# Patient Record
Sex: FEMALE | NOT HISPANIC OR LATINO | Employment: FULL TIME | URBAN - METROPOLITAN AREA
[De-identification: names, ages, dates, MRNs, and addresses within clinical notes are randomized per-mention and may not be internally consistent; named-entity substitution may affect disease eponyms.]

---

## 2020-04-01 ENCOUNTER — TELEMEDICINE (OUTPATIENT)
Dept: ENDOCRINOLOGY | Facility: HOSPITAL | Age: 30
End: 2020-04-01
Payer: COMMERCIAL

## 2020-04-01 ENCOUNTER — TELEPHONE (OUTPATIENT)
Dept: ENDOCRINOLOGY | Facility: HOSPITAL | Age: 30
End: 2020-04-01

## 2020-04-01 DIAGNOSIS — E28.2 PCOS (POLYCYSTIC OVARIAN SYNDROME): Primary | ICD-10-CM

## 2020-04-01 DIAGNOSIS — D35.2 MICROPROLACTINOMA (HCC): ICD-10-CM

## 2020-04-01 DIAGNOSIS — R63.5 WEIGHT GAIN: ICD-10-CM

## 2020-04-01 PROCEDURE — 99203 OFFICE O/P NEW LOW 30 MIN: CPT | Performed by: INTERNAL MEDICINE

## 2020-04-01 RX ORDER — METFORMIN HYDROCHLORIDE 500 MG/1
500 TABLET, EXTENDED RELEASE ORAL 3 TIMES DAILY
COMMUNITY
Start: 2020-01-07 | End: 2020-04-01 | Stop reason: SDUPTHER

## 2020-04-01 RX ORDER — DEXAMETHASONE 1 MG
TABLET ORAL
Qty: 1 TABLET | Refills: 0 | Status: SHIPPED | OUTPATIENT
Start: 2020-04-01 | End: 2020-06-04

## 2020-04-01 RX ORDER — METFORMIN HYDROCHLORIDE 500 MG/1
TABLET, EXTENDED RELEASE ORAL
Qty: 360 TABLET | Refills: 3 | Status: SHIPPED | OUTPATIENT
Start: 2020-04-01 | End: 2021-02-16

## 2020-04-24 LAB
17OHP SERPL-MCNC: 287 NG/DL
ALBUMIN SERPL-MCNC: 4.8 G/DL (ref 3.8–4.8)
ALBUMIN/GLOB SERPL: 2.5 {RATIO} (ref 1.2–2.2)
ALP SERPL-CCNC: 83 IU/L (ref 39–117)
ALT SERPL-CCNC: 11 IU/L (ref 0–32)
AST SERPL-CCNC: 14 IU/L (ref 0–40)
BASOPHILS # BLD AUTO: 0 X10E3/UL (ref 0–0.2)
BASOPHILS NFR BLD AUTO: 1 %
BILIRUB SERPL-MCNC: 0.3 MG/DL (ref 0–1.2)
BUN SERPL-MCNC: 10 MG/DL (ref 6–20)
BUN/CREAT SERPL: 14 (ref 9–23)
CALCIUM SERPL-MCNC: 9.5 MG/DL (ref 8.7–10.2)
CHLORIDE SERPL-SCNC: 102 MMOL/L (ref 96–106)
CO2 SERPL-SCNC: 21 MMOL/L (ref 20–29)
CORTIS AM PEAK SERPL-MCNC: 0.8 UG/DL (ref 6.2–19.4)
CREAT SERPL-MCNC: 0.73 MG/DL (ref 0.57–1)
DEXAMETHASONE SERPL-MCNC: 283 NG/DL
DHEA-S SERPL-MCNC: 232 UG/DL (ref 84.8–378)
EOSINOPHIL # BLD AUTO: 0 X10E3/UL (ref 0–0.4)
EOSINOPHIL NFR BLD AUTO: 0 %
ERYTHROCYTE [DISTWIDTH] IN BLOOD BY AUTOMATED COUNT: 11.8 % (ref 11.7–15.4)
EST. AVERAGE GLUCOSE BLD GHB EST-MCNC: 85 MG/DL
FERRITIN SERPL-MCNC: 29 NG/ML (ref 15–150)
FSH SERPL-ACNC: 5.3 MIU/ML
GLOBULIN SER-MCNC: 1.9 G/DL (ref 1.5–4.5)
GLUCOSE SERPL-MCNC: 103 MG/DL (ref 65–99)
HBA1C MFR BLD: 4.6 % (ref 4.8–5.6)
HCT VFR BLD AUTO: 44.6 % (ref 34–46.6)
HGB BLD-MCNC: 15.1 G/DL (ref 11.1–15.9)
IGF-I SERPL-MCNC: 192 NG/ML (ref 73–243)
IMM GRANULOCYTES # BLD: 0 X10E3/UL (ref 0–0.1)
IMM GRANULOCYTES NFR BLD: 0 %
IRON SATN MFR SERPL: 23 % (ref 15–55)
IRON SERPL-MCNC: 101 UG/DL (ref 27–159)
LH SERPL-ACNC: 15.9 MIU/ML
LYMPHOCYTES # BLD AUTO: 0.8 X10E3/UL (ref 0.7–3.1)
LYMPHOCYTES NFR BLD AUTO: 18 %
MCH RBC QN AUTO: 31.5 PG (ref 26.6–33)
MCHC RBC AUTO-ENTMCNC: 33.9 G/DL (ref 31.5–35.7)
MCV RBC AUTO: 93 FL (ref 79–97)
MONOCYTES # BLD AUTO: 0.3 X10E3/UL (ref 0.1–0.9)
MONOCYTES NFR BLD AUTO: 7 %
NEUTROPHILS # BLD AUTO: 3.2 X10E3/UL (ref 1.4–7)
NEUTROPHILS NFR BLD AUTO: 74 %
PLATELET # BLD AUTO: 198 X10E3/UL (ref 150–450)
POTASSIUM SERPL-SCNC: 4.8 MMOL/L (ref 3.5–5.2)
PROLACTIN SERPL-MCNC: 14.3 NG/ML (ref 4.8–23.3)
PROT SERPL-MCNC: 6.7 G/DL (ref 6–8.5)
RBC # BLD AUTO: 4.79 X10E6/UL (ref 3.77–5.28)
SL AMB EGFR AFRICAN AMERICAN: 127 ML/MIN/1.73
SL AMB EGFR NON AFRICAN AMERICAN: 110 ML/MIN/1.73
SODIUM SERPL-SCNC: 139 MMOL/L (ref 134–144)
T4 FREE SERPL-MCNC: 1 NG/DL (ref 0.82–1.77)
TESTOST FREE SERPL-MCNC: 0.7 PG/ML (ref 0–4.2)
TESTOST SERPL-MCNC: 39 NG/DL (ref 8–48)
TIBC SERPL-MCNC: 437 UG/DL (ref 250–450)
TSH SERPL DL<=0.005 MIU/L-ACNC: 1.89 UIU/ML (ref 0.45–4.5)
UIBC SERPL-MCNC: 336 UG/DL (ref 131–425)
WBC # BLD AUTO: 4.3 X10E3/UL (ref 3.4–10.8)

## 2020-04-27 ENCOUNTER — TELEPHONE (OUTPATIENT)
Dept: ENDOCRINOLOGY | Facility: HOSPITAL | Age: 30
End: 2020-04-27

## 2020-04-27 DIAGNOSIS — E28.2 PCOS (POLYCYSTIC OVARIAN SYNDROME): Primary | ICD-10-CM

## 2020-05-25 LAB
17OH-PREG SERPL-MCNC: 87 NG/DL
DHEA-S SERPL-MCNC: 330 UG/DL (ref 84.8–378)
TESTOST FREE SERPL-MCNC: 2.5 PG/ML (ref 0–4.2)

## 2020-06-02 LAB — 17OHP SERPL-MCNC: 207 NG/DL

## 2020-06-04 ENCOUNTER — TREATMENT (OUTPATIENT)
Dept: ENDOCRINOLOGY | Facility: HOSPITAL | Age: 30
End: 2020-06-04

## 2020-06-04 PROBLEM — R79.89 HIGH SERUM 17-HYDROXYPROGESTERONE: Status: ACTIVE | Noted: 2020-06-04

## 2020-06-04 PROBLEM — E25.0 CONGENITAL ADRENAL HYPERPLASIA (HCC): Status: ACTIVE | Noted: 2020-06-04

## 2020-06-04 RX ORDER — COSYNTROPIN 0.25 MG/ML
250 INJECTION, POWDER, FOR SOLUTION INTRAMUSCULAR; INTRAVENOUS ONCE
Status: CANCELLED | OUTPATIENT
Start: 2020-06-16

## 2020-06-08 ENCOUNTER — TELEPHONE (OUTPATIENT)
Dept: ENDOCRINOLOGY | Facility: HOSPITAL | Age: 30
End: 2020-06-08

## 2020-06-10 ENCOUNTER — HOSPITAL ENCOUNTER (OUTPATIENT)
Dept: INFUSION CENTER | Facility: HOSPITAL | Age: 30
Discharge: HOME | End: 2020-06-10
Attending: INTERNAL MEDICINE

## 2020-06-12 LAB
17OHP SERPL-MCNC: 50 NG/DL
TESTOST FREE SERPL-MCNC: 2.6 PG/ML (ref 0–4.2)
TESTOST SERPL-MCNC: 49 NG/DL (ref 8–48)

## 2020-06-16 ENCOUNTER — TELEPHONE (OUTPATIENT)
Dept: ENDOCRINOLOGY | Facility: HOSPITAL | Age: 30
End: 2020-06-16

## 2020-06-16 ENCOUNTER — HOSPITAL ENCOUNTER (OUTPATIENT)
Dept: INFUSION CENTER | Facility: HOSPITAL | Age: 30
Discharge: HOME | End: 2020-06-16
Attending: INTERNAL MEDICINE
Payer: COMMERCIAL

## 2020-06-16 VITALS
HEART RATE: 89 BPM | TEMPERATURE: 98.5 F | RESPIRATION RATE: 16 BRPM | SYSTOLIC BLOOD PRESSURE: 132 MMHG | OXYGEN SATURATION: 100 % | DIASTOLIC BLOOD PRESSURE: 71 MMHG

## 2020-06-16 DIAGNOSIS — E25.0 CONGENITAL ADRENAL HYPERPLASIA (HCC): Primary | ICD-10-CM

## 2020-06-16 DIAGNOSIS — R79.89 HIGH SERUM 17-HYDROXYPROGESTERONE: ICD-10-CM

## 2020-06-16 LAB
CORTIS SERPL-MCNC: 26.9 UG/DL
CORTIS SERPL-MCNC: 32.5 UG/DL
CORTIS SERPL-MCNC: 7.2 UG/DL

## 2020-06-16 PROCEDURE — 82634 DEOXYCORTISOL: CPT | Performed by: INTERNAL MEDICINE

## 2020-06-16 PROCEDURE — 82157 ASSAY OF ANDROSTENEDIONE: CPT | Performed by: INTERNAL MEDICINE

## 2020-06-16 PROCEDURE — 82627 DEHYDROEPIANDROSTERONE: CPT | Performed by: INTERNAL MEDICINE

## 2020-06-16 PROCEDURE — 82533 TOTAL CORTISOL: CPT | Performed by: INTERNAL MEDICINE

## 2020-06-16 PROCEDURE — 83498 ASY HYDROXYPROGESTERONE 17-D: CPT | Performed by: INTERNAL MEDICINE

## 2020-06-16 PROCEDURE — 84143 ASSAY OF 17-HYDROXYPREGNENO: CPT | Performed by: INTERNAL MEDICINE

## 2020-06-16 PROCEDURE — 82626 DEHYDROEPIANDROSTERONE: CPT | Performed by: INTERNAL MEDICINE

## 2020-06-16 RX ORDER — COSYNTROPIN 0.25 MG/ML
250 INJECTION, POWDER, FOR SOLUTION INTRAMUSCULAR; INTRAVENOUS ONCE
Status: COMPLETED | OUTPATIENT
Start: 2020-06-16 | End: 2020-06-16

## 2020-06-16 RX ORDER — COSYNTROPIN 0.25 MG/ML
250 INJECTION, POWDER, FOR SOLUTION INTRAMUSCULAR; INTRAVENOUS ONCE
Status: CANCELLED | OUTPATIENT
Start: 2020-06-16

## 2020-06-16 RX ORDER — COSYNTROPIN 0.25 MG/ML
0.25 INJECTION, POWDER, FOR SOLUTION INTRAMUSCULAR; INTRAVENOUS ONCE
Status: CANCELLED | OUTPATIENT
Start: 2020-06-16

## 2020-06-16 RX ADMIN — COSYNTROPIN 250 MCG: 0.25 INJECTION, POWDER, LYOPHILIZED, FOR SOLUTION INTRAMUSCULAR; INTRAVENOUS at 10:53

## 2020-06-17 LAB
DHEA-S SERPL-MCNC: 357 UG/DL (ref 84.8–378)
DHEA-S SERPL-MCNC: 364 UG/DL (ref 84.8–378)
DHEA-S SERPL-MCNC: 398 UG/DL (ref 84.8–378)

## 2020-06-19 LAB — 17OHP SERPL-MCNC: 212 NG/DL

## 2020-06-21 LAB
17OHP SERPL-MCNC: 224 NG/DL
17OHP SERPL-MCNC: 237 NG/DL
ANDROST SERPL-MCNC: 105 NG/DL (ref 41–262)
ANDROST SERPL-MCNC: 135 NG/DL (ref 41–262)
ANDROST SERPL-MCNC: 174 NG/DL (ref 41–262)

## 2020-06-22 LAB
17OH-PREG SERPL-MCNC: 1010 NG/DL
17OH-PREG SERPL-MCNC: 45 NG/DL
17OH-PREG SERPL-MCNC: 944 NG/DL
DHEA SERPL-MCNC: 220 NG/DL (ref 31–701)
DHEA SERPL-MCNC: 714 NG/DL (ref 31–701)
DHEA SERPL-MCNC: 843 NG/DL (ref 31–701)

## 2020-06-23 LAB
11DC SERPL-MCNC: 0.08 UG/DL
11DC SERPL-MCNC: 0.08 UG/DL
11DC SERPL-MCNC: <0.01 UG/DL

## 2020-07-01 DIAGNOSIS — E66.3 OVERWEIGHT: ICD-10-CM

## 2020-07-01 DIAGNOSIS — R73.9 HYPERGLYCEMIA: ICD-10-CM

## 2020-07-01 DIAGNOSIS — E28.2 PCOS (POLYCYSTIC OVARIAN SYNDROME): Primary | ICD-10-CM

## 2020-07-09 ENCOUNTER — TELEPHONE (OUTPATIENT)
Dept: ENDOCRINOLOGY | Facility: HOSPITAL | Age: 30
End: 2020-07-09

## 2020-07-13 DIAGNOSIS — E28.2 PCOS (POLYCYSTIC OVARIAN SYNDROME): ICD-10-CM

## 2020-08-17 DIAGNOSIS — E66.3 OVERWEIGHT: ICD-10-CM

## 2020-08-17 DIAGNOSIS — E28.2 PCOS (POLYCYSTIC OVARIAN SYNDROME): ICD-10-CM

## 2020-08-17 DIAGNOSIS — R73.9 HYPERGLYCEMIA: ICD-10-CM

## 2020-08-18 RX ORDER — PEN NEEDLE, DIABETIC 32GX 5/32"
NEEDLE, DISPOSABLE MISCELLANEOUS
Qty: 100 EACH | Refills: 0 | Status: SHIPPED | OUTPATIENT
Start: 2020-08-18 | End: 2021-04-16

## 2020-09-22 ENCOUNTER — DOCUMENTATION (OUTPATIENT)
Dept: ENDOCRINOLOGY | Facility: HOSPITAL | Age: 30
End: 2020-09-22

## 2020-10-22 DIAGNOSIS — E66.3 OVERWEIGHT: ICD-10-CM

## 2020-10-22 DIAGNOSIS — R73.9 HYPERGLYCEMIA: ICD-10-CM

## 2020-10-22 DIAGNOSIS — E28.2 PCOS (POLYCYSTIC OVARIAN SYNDROME): ICD-10-CM

## 2020-10-22 RX ORDER — LIRAGLUTIDE 6 MG/ML
INJECTION, SOLUTION SUBCUTANEOUS
Qty: 15 ML | Refills: 0 | Status: SHIPPED | OUTPATIENT
Start: 2020-10-22 | End: 2020-11-09

## 2020-11-09 DIAGNOSIS — E66.3 OVERWEIGHT: ICD-10-CM

## 2020-11-09 DIAGNOSIS — E28.2 PCOS (POLYCYSTIC OVARIAN SYNDROME): ICD-10-CM

## 2020-11-09 DIAGNOSIS — R73.9 HYPERGLYCEMIA: ICD-10-CM

## 2020-11-09 RX ORDER — LIRAGLUTIDE 6 MG/ML
INJECTION, SOLUTION SUBCUTANEOUS
Qty: 15 ML | Refills: 0 | Status: SHIPPED | OUTPATIENT
Start: 2020-11-09 | End: 2020-11-11 | Stop reason: SDUPTHER

## 2020-11-11 ENCOUNTER — OFFICE VISIT (OUTPATIENT)
Dept: ENDOCRINOLOGY | Facility: HOSPITAL | Age: 30
End: 2020-11-11
Payer: COMMERCIAL

## 2020-11-11 VITALS
DIASTOLIC BLOOD PRESSURE: 80 MMHG | HEIGHT: 65 IN | TEMPERATURE: 97.8 F | HEART RATE: 91 BPM | BODY MASS INDEX: 26.36 KG/M2 | SYSTOLIC BLOOD PRESSURE: 122 MMHG | WEIGHT: 158.2 LBS

## 2020-11-11 DIAGNOSIS — D35.2 MICROPROLACTINOMA (HCC): ICD-10-CM

## 2020-11-11 DIAGNOSIS — E28.2 PCOS (POLYCYSTIC OVARIAN SYNDROME): Primary | ICD-10-CM

## 2020-11-11 DIAGNOSIS — R73.9 HYPERGLYCEMIA: ICD-10-CM

## 2020-11-11 DIAGNOSIS — E66.3 OVERWEIGHT: ICD-10-CM

## 2020-11-11 PROCEDURE — 99214 OFFICE O/P EST MOD 30 MIN: CPT | Performed by: INTERNAL MEDICINE

## 2020-11-11 RX ORDER — LIRAGLUTIDE 6 MG/ML
INJECTION, SOLUTION SUBCUTANEOUS
Qty: 15 ML | Refills: 11 | Status: SHIPPED | OUTPATIENT
Start: 2020-11-11 | End: 2021-04-16

## 2020-12-22 ENCOUNTER — DOCUMENTATION (OUTPATIENT)
Dept: ENDOCRINOLOGY | Facility: HOSPITAL | Age: 30
End: 2020-12-22

## 2020-12-29 DIAGNOSIS — D35.2 MICROPROLACTINOMA (HCC): ICD-10-CM

## 2020-12-29 DIAGNOSIS — R73.9 HYPERGLYCEMIA: ICD-10-CM

## 2020-12-29 DIAGNOSIS — E28.2 PCOS (POLYCYSTIC OVARIAN SYNDROME): Primary | ICD-10-CM

## 2020-12-29 DIAGNOSIS — E66.3 OVERWEIGHT: ICD-10-CM

## 2021-01-05 LAB
17OHP SERPL-MCNC: 48 NG/DL
ALBUMIN SERPL-MCNC: 4.5 G/DL (ref 3.8–4.8)
ALBUMIN/GLOB SERPL: 2 {RATIO} (ref 1.2–2.2)
ALP SERPL-CCNC: 90 IU/L (ref 39–117)
ALT SERPL-CCNC: 15 IU/L (ref 0–32)
AST SERPL-CCNC: 20 IU/L (ref 0–40)
BASOPHILS # BLD AUTO: 0 X10E3/UL (ref 0–0.2)
BASOPHILS NFR BLD AUTO: 1 %
BILIRUB SERPL-MCNC: 0.4 MG/DL (ref 0–1.2)
BUN SERPL-MCNC: 10 MG/DL (ref 6–20)
BUN/CREAT SERPL: 15 (ref 9–23)
CALCIUM SERPL-MCNC: 9.5 MG/DL (ref 8.7–10.2)
CHLORIDE SERPL-SCNC: 102 MMOL/L (ref 96–106)
CO2 SERPL-SCNC: 22 MMOL/L (ref 20–29)
CREAT SERPL-MCNC: 0.66 MG/DL (ref 0.57–1)
DHEA-S SERPL-MCNC: 194 UG/DL
EOSINOPHIL # BLD AUTO: 0.1 X10E3/UL (ref 0–0.4)
EOSINOPHIL NFR BLD AUTO: 3 %
ERYTHROCYTE [DISTWIDTH] IN BLOOD BY AUTOMATED COUNT: 11.5 % (ref 11.7–15.4)
GLOBULIN SER-MCNC: 2.3 G/DL (ref 1.5–4.5)
GLUCOSE SERPL-MCNC: 87 MG/DL (ref 65–99)
HCT VFR BLD AUTO: 42 % (ref 34–46.6)
HGB BLD-MCNC: 14.8 G/DL (ref 11.1–15.9)
IMM GRANULOCYTES # BLD: 0 X10E3/UL (ref 0–0.1)
IMM GRANULOCYTES NFR BLD: 0 %
LYMPHOCYTES # BLD AUTO: 1 X10E3/UL (ref 0.7–3.1)
LYMPHOCYTES NFR BLD AUTO: 24 %
MCH RBC QN AUTO: 33 PG (ref 26.6–33)
MCHC RBC AUTO-ENTMCNC: 35.2 G/DL (ref 31.5–35.7)
MCV RBC AUTO: 94 FL (ref 79–97)
MONOCYTES # BLD AUTO: 0.5 X10E3/UL (ref 0.1–0.9)
MONOCYTES NFR BLD AUTO: 12 %
NEUTROPHILS # BLD AUTO: 2.3 X10E3/UL (ref 1.4–7)
NEUTROPHILS NFR BLD AUTO: 60 %
PLATELET # BLD AUTO: 195 X10E3/UL (ref 150–450)
POTASSIUM SERPL-SCNC: 4.3 MMOL/L (ref 3.5–5.2)
PROLACTIN SERPL-MCNC: 10.7 NG/ML (ref 4.8–23.3)
PROT SERPL-MCNC: 6.8 G/DL (ref 6–8.5)
RBC # BLD AUTO: 4.48 X10E6/UL (ref 3.77–5.28)
SL AMB EGFR AFRICAN AMERICAN: 135 ML/MIN/1.73
SL AMB EGFR NON AFRICAN AMERICAN: 117 ML/MIN/1.73
SODIUM SERPL-SCNC: 139 MMOL/L (ref 134–144)
T4 FREE SERPL-MCNC: 1.17 NG/DL (ref 0.82–1.77)
TESTOST FREE SERPL-MCNC: 2.6 PG/ML (ref 0–4.2)
TESTOST SERPL-MCNC: 47.4 NG/DL (ref 10–55)
TSH SERPL DL<=0.005 MIU/L-ACNC: 1.91 UIU/ML (ref 0.45–4.5)
WBC # BLD AUTO: 3.9 X10E3/UL (ref 3.4–10.8)

## 2021-02-16 DIAGNOSIS — E28.2 PCOS (POLYCYSTIC OVARIAN SYNDROME): ICD-10-CM

## 2021-02-16 RX ORDER — METFORMIN HYDROCHLORIDE 500 MG/1
TABLET, EXTENDED RELEASE ORAL
Qty: 360 TABLET | Refills: 3 | Status: SHIPPED | OUTPATIENT
Start: 2021-02-16 | End: 2021-04-16

## 2021-08-27 ENCOUNTER — TELEPHONE (OUTPATIENT)
Dept: ENDOCRINOLOGY | Facility: HOSPITAL | Age: 31
End: 2021-08-27

## 2021-10-15 DIAGNOSIS — E66.3 OVERWEIGHT: ICD-10-CM

## 2021-10-15 DIAGNOSIS — E28.2 PCOS (POLYCYSTIC OVARIAN SYNDROME): ICD-10-CM

## 2021-10-15 DIAGNOSIS — R73.9 HYPERGLYCEMIA: ICD-10-CM

## 2021-10-15 RX ORDER — LIRAGLUTIDE 6 MG/ML
INJECTION, SOLUTION SUBCUTANEOUS
Qty: 15 ML | Refills: 0 | Status: SHIPPED | OUTPATIENT
Start: 2021-10-15 | End: 2021-11-15

## 2021-10-19 LAB — SARS-COV-2 ANTIBODIES: NEGATIVE

## 2021-10-20 ENCOUNTER — OFFICE VISIT (OUTPATIENT)
Dept: ENDOCRINOLOGY | Facility: HOSPITAL | Age: 31
End: 2021-10-20
Payer: COMMERCIAL

## 2021-10-20 VITALS
BODY MASS INDEX: 25.59 KG/M2 | WEIGHT: 153.6 LBS | HEART RATE: 84 BPM | SYSTOLIC BLOOD PRESSURE: 124 MMHG | HEIGHT: 65 IN | DIASTOLIC BLOOD PRESSURE: 80 MMHG

## 2021-10-20 DIAGNOSIS — D35.2 MICROPROLACTINOMA (HCC): Primary | ICD-10-CM

## 2021-10-20 DIAGNOSIS — E28.2 PCOS (POLYCYSTIC OVARIAN SYNDROME): ICD-10-CM

## 2021-10-20 PROCEDURE — 99214 OFFICE O/P EST MOD 30 MIN: CPT | Performed by: INTERNAL MEDICINE

## 2021-10-23 LAB
17OHP SERPL-MCNC: 65 NG/DL
ALBUMIN SERPL-MCNC: 4.8 G/DL (ref 3.8–4.8)
ALBUMIN/GLOB SERPL: 2.3 {RATIO} (ref 1.2–2.2)
ALP SERPL-CCNC: 77 IU/L (ref 44–121)
ALT SERPL-CCNC: 11 IU/L (ref 0–32)
AST SERPL-CCNC: 13 IU/L (ref 0–40)
BILIRUB SERPL-MCNC: 0.7 MG/DL (ref 0–1.2)
BUN SERPL-MCNC: 7 MG/DL (ref 6–20)
BUN/CREAT SERPL: 11 (ref 9–23)
CALCIUM SERPL-MCNC: 9.2 MG/DL (ref 8.7–10.2)
CHLORIDE SERPL-SCNC: 103 MMOL/L (ref 96–106)
CO2 SERPL-SCNC: 23 MMOL/L (ref 20–29)
CREAT SERPL-MCNC: 0.64 MG/DL (ref 0.57–1)
DHEA-S SERPL-MCNC: 324 UG/DL (ref 84.8–378)
GLOBULIN SER-MCNC: 2.1 G/DL (ref 1.5–4.5)
GLUCOSE SERPL-MCNC: 89 MG/DL (ref 65–99)
POTASSIUM SERPL-SCNC: 4.1 MMOL/L (ref 3.5–5.2)
PROLACTIN SERPL-MCNC: 15.5 NG/ML (ref 4.8–23.3)
PROT SERPL-MCNC: 6.9 G/DL (ref 6–8.5)
SL AMB EGFR AFRICAN AMERICAN: 136 ML/MIN/1.73
SL AMB EGFR NON AFRICAN AMERICAN: 118 ML/MIN/1.73
SODIUM SERPL-SCNC: 140 MMOL/L (ref 134–144)
T4 FREE SERPL-MCNC: 1.29 NG/DL (ref 0.82–1.77)
TESTOST FREE SERPL-MCNC: 3.6 PG/ML (ref 0–4.2)
TESTOST SERPL-MCNC: 68 NG/DL (ref 8–60)
TSH SERPL DL<=0.005 MIU/L-ACNC: 1.86 UIU/ML (ref 0.45–4.5)

## 2021-11-15 DIAGNOSIS — E66.3 OVERWEIGHT: ICD-10-CM

## 2021-11-15 DIAGNOSIS — R73.9 HYPERGLYCEMIA: ICD-10-CM

## 2021-11-15 DIAGNOSIS — E28.2 PCOS (POLYCYSTIC OVARIAN SYNDROME): ICD-10-CM

## 2021-11-15 RX ORDER — LIRAGLUTIDE 6 MG/ML
INJECTION, SOLUTION SUBCUTANEOUS
Qty: 15 ML | Refills: 0 | Status: SHIPPED | OUTPATIENT
Start: 2021-11-15 | End: 2021-12-13 | Stop reason: SDUPTHER

## 2021-11-24 ENCOUNTER — DOCUMENTATION (OUTPATIENT)
Dept: ENDOCRINOLOGY | Facility: HOSPITAL | Age: 31
End: 2021-11-24

## 2021-12-13 DIAGNOSIS — E28.2 PCOS (POLYCYSTIC OVARIAN SYNDROME): ICD-10-CM

## 2021-12-13 DIAGNOSIS — E66.3 OVERWEIGHT: ICD-10-CM

## 2021-12-13 DIAGNOSIS — R73.9 HYPERGLYCEMIA: ICD-10-CM

## 2021-12-13 RX ORDER — LIRAGLUTIDE 6 MG/ML
3 INJECTION, SOLUTION SUBCUTANEOUS DAILY
Qty: 15 ML | Refills: 11 | Status: SHIPPED | OUTPATIENT
Start: 2021-12-13

## 2021-12-13 RX ORDER — PEN NEEDLE, DIABETIC 32GX 5/32"
NEEDLE, DISPOSABLE MISCELLANEOUS DAILY
Qty: 100 EACH | Refills: 3 | Status: SHIPPED | OUTPATIENT
Start: 2021-12-13

## 2021-12-27 DIAGNOSIS — E28.2 PCOS (POLYCYSTIC OVARIAN SYNDROME): Primary | ICD-10-CM

## 2022-03-14 DIAGNOSIS — E28.2 PCOS (POLYCYSTIC OVARIAN SYNDROME): Primary | ICD-10-CM

## 2022-05-26 DIAGNOSIS — E28.2 PCOS (POLYCYSTIC OVARIAN SYNDROME): ICD-10-CM

## 2022-06-17 DIAGNOSIS — E28.2 PCOS (POLYCYSTIC OVARIAN SYNDROME): ICD-10-CM

## 2022-07-11 DIAGNOSIS — E28.2 PCOS (POLYCYSTIC OVARIAN SYNDROME): ICD-10-CM

## 2022-07-28 DIAGNOSIS — E28.2 PCOS (POLYCYSTIC OVARIAN SYNDROME): ICD-10-CM

## 2022-08-29 DIAGNOSIS — E28.2 PCOS (POLYCYSTIC OVARIAN SYNDROME): ICD-10-CM

## 2022-09-21 DIAGNOSIS — E28.2 PCOS (POLYCYSTIC OVARIAN SYNDROME): ICD-10-CM

## 2022-11-01 ENCOUNTER — TELEPHONE (OUTPATIENT)
Dept: ENDOCRINOLOGY | Facility: HOSPITAL | Age: 32
End: 2022-11-01

## 2022-11-01 NOTE — TELEPHONE ENCOUNTER
Received fax to update prior auth for Saxenda 18mg/3mL  Received notice medication is not covered by the patient's plan  Please advise

## 2022-11-07 DIAGNOSIS — E28.2 PCOS (POLYCYSTIC OVARIAN SYNDROME): ICD-10-CM

## 2022-11-07 DIAGNOSIS — E66.3 OVERWEIGHT: ICD-10-CM

## 2022-11-07 DIAGNOSIS — R73.9 HYPERGLYCEMIA: ICD-10-CM

## 2022-11-07 RX ORDER — LIRAGLUTIDE 6 MG/ML
3 INJECTION, SOLUTION SUBCUTANEOUS DAILY
Qty: 15 ML | Refills: 0 | Status: SHIPPED | OUTPATIENT
Start: 2022-11-07

## 2022-11-08 ENCOUNTER — TELEPHONE (OUTPATIENT)
Dept: ENDOCRINOLOGY | Facility: CLINIC | Age: 32
End: 2022-11-08

## 2022-11-08 NOTE — TELEPHONE ENCOUNTER
David Darling [facility])  Rx #: [id number]  Saxenda 18MG/3ML pen-injectors       Form  [employer] 4-Part Electronic PA Form (5840 NCPDP)

## 2022-11-29 DIAGNOSIS — E28.2 PCOS (POLYCYSTIC OVARIAN SYNDROME): ICD-10-CM

## 2022-11-30 DIAGNOSIS — E28.2 PCOS (POLYCYSTIC OVARIAN SYNDROME): ICD-10-CM

## 2022-12-06 DIAGNOSIS — E28.2 PCOS (POLYCYSTIC OVARIAN SYNDROME): Primary | ICD-10-CM

## 2022-12-06 DIAGNOSIS — E66.3 OVERWEIGHT: ICD-10-CM

## 2022-12-06 DIAGNOSIS — D35.2 MICROPROLACTINOMA (HCC): ICD-10-CM

## 2022-12-06 DIAGNOSIS — R73.9 HYPERGLYCEMIA: ICD-10-CM

## 2022-12-14 DIAGNOSIS — E28.2 PCOS (POLYCYSTIC OVARIAN SYNDROME): ICD-10-CM

## 2022-12-14 DIAGNOSIS — R73.9 HYPERGLYCEMIA: ICD-10-CM

## 2022-12-14 DIAGNOSIS — E66.3 OVERWEIGHT: ICD-10-CM

## 2022-12-14 RX ORDER — LIRAGLUTIDE 6 MG/ML
3 INJECTION, SOLUTION SUBCUTANEOUS DAILY
Qty: 15 ML | Refills: 0 | Status: SHIPPED | OUTPATIENT
Start: 2022-12-14

## 2023-01-09 DIAGNOSIS — R73.9 HYPERGLYCEMIA: ICD-10-CM

## 2023-01-09 DIAGNOSIS — E66.3 OVERWEIGHT: ICD-10-CM

## 2023-01-09 DIAGNOSIS — E28.2 PCOS (POLYCYSTIC OVARIAN SYNDROME): ICD-10-CM

## 2023-01-09 RX ORDER — LIRAGLUTIDE 6 MG/ML
INJECTION, SOLUTION SUBCUTANEOUS
Qty: 15 ML | Refills: 0 | Status: SHIPPED | OUTPATIENT
Start: 2023-01-09

## 2023-01-09 RX ORDER — LIRAGLUTIDE 6 MG/ML
3 INJECTION, SOLUTION SUBCUTANEOUS DAILY
Qty: 15 ML | Refills: 0 | Status: SHIPPED | OUTPATIENT
Start: 2023-01-09

## 2023-01-09 RX ORDER — PEN NEEDLE, DIABETIC 32GX 5/32"
NEEDLE, DISPOSABLE MISCELLANEOUS DAILY
Qty: 100 EACH | Refills: 0 | Status: SHIPPED | OUTPATIENT
Start: 2023-01-09

## 2023-01-14 LAB
17OHP SERPL-MCNC: 53 NG/DL
ALBUMIN SERPL-MCNC: 4.5 G/DL (ref 3.8–4.8)
ALBUMIN/GLOB SERPL: 2 {RATIO} (ref 1.2–2.2)
ALP SERPL-CCNC: 86 IU/L (ref 44–121)
ALT SERPL-CCNC: 17 IU/L (ref 0–32)
AST SERPL-CCNC: 21 IU/L (ref 0–40)
BILIRUB SERPL-MCNC: 0.6 MG/DL (ref 0–1.2)
BUN SERPL-MCNC: 6 MG/DL (ref 6–20)
BUN/CREAT SERPL: 10 (ref 9–23)
CALCIUM SERPL-MCNC: 9.2 MG/DL (ref 8.7–10.2)
CHLORIDE SERPL-SCNC: 100 MMOL/L (ref 96–106)
CO2 SERPL-SCNC: 24 MMOL/L (ref 20–29)
CREAT SERPL-MCNC: 0.6 MG/DL (ref 0.57–1)
DHEA-S SERPL-MCNC: 272 UG/DL (ref 84.8–378)
EGFR: 121 ML/MIN/1.73
GLOBULIN SER-MCNC: 2.3 G/DL (ref 1.5–4.5)
GLUCOSE SERPL-MCNC: 83 MG/DL (ref 70–99)
POTASSIUM SERPL-SCNC: 4 MMOL/L (ref 3.5–5.2)
PROLACTIN SERPL-MCNC: 12 NG/ML (ref 4.8–23.3)
PROT SERPL-MCNC: 6.8 G/DL (ref 6–8.5)
SODIUM SERPL-SCNC: 140 MMOL/L (ref 134–144)
TESTOST FREE SERPL-MCNC: 3.3 PG/ML (ref 0–4.2)
TESTOST SERPL-MCNC: 64 NG/DL (ref 8–60)
TSH SERPL DL<=0.005 MIU/L-ACNC: 2.13 UIU/ML (ref 0.45–4.5)

## 2023-01-19 DIAGNOSIS — E28.2 PCOS (POLYCYSTIC OVARIAN SYNDROME): ICD-10-CM

## 2023-01-20 NOTE — TELEPHONE ENCOUNTER
Requested medication(s) are due for refill today: Yes  Patient has already received a courtesy refill: Yes  Other reason request has been forwarded to provider: Duplicated

## 2023-01-30 ENCOUNTER — OFFICE VISIT (OUTPATIENT)
Dept: ENDOCRINOLOGY | Facility: CLINIC | Age: 33
End: 2023-01-30

## 2023-01-30 VITALS
HEART RATE: 80 BPM | WEIGHT: 160.6 LBS | OXYGEN SATURATION: 99 % | DIASTOLIC BLOOD PRESSURE: 76 MMHG | HEIGHT: 66 IN | BODY MASS INDEX: 25.81 KG/M2 | SYSTOLIC BLOOD PRESSURE: 132 MMHG

## 2023-01-30 DIAGNOSIS — E28.2 PCOS (POLYCYSTIC OVARIAN SYNDROME): Primary | ICD-10-CM

## 2023-01-30 DIAGNOSIS — R73.9 HYPERGLYCEMIA: ICD-10-CM

## 2023-01-30 NOTE — PROGRESS NOTES
1/30/2023    Assessment/Plan      Diagnoses and all orders for this visit:    PCOS (polycystic ovarian syndrome)  -     Hemoglobin A1C; Future  -     Comprehensive metabolic panel; Future  -     TSH, 3rd generation; Future  -     Testosterone, free, total Lab Collect; Future  -     DHEA-sulfate- Lab Collect; Future  -     17-Hydroxyprogesterone- Lab Collect; Future  -     Prolactin Lab Collect; Future  -     Hemoglobin A1C  -     Comprehensive metabolic panel  -     TSH, 3rd generation  -     Testosterone, free, total Lab Collect  -     DHEA-sulfate- Lab Collect  -     17-Hydroxyprogesterone- Lab Collect  -     Prolactin Lab Collect    Hyperglycemia  -     Hemoglobin A1C; Future  -     Comprehensive metabolic panel; Future  -     TSH, 3rd generation; Future  -     Testosterone, free, total Lab Collect; Future  -     DHEA-sulfate- Lab Collect; Future  -     17-Hydroxyprogesterone- Lab Collect; Future  -     Prolactin Lab Collect; Future  -     Hemoglobin A1C  -     Comprehensive metabolic panel  -     TSH, 3rd generation  -     Testosterone, free, total Lab Collect  -     DHEA-sulfate- Lab Collect  -     17-Hydroxyprogesterone- Lab Collect  -     Prolactin Lab Collect        Assessment/Plan:  78-year-old female presents for a follow-up appointment overall feeling well  She continues to tolerate and doing well on maintenance metformin and Saxenda in the setting of PCOS and also for weight loss  Denies plans for pregnancy at this time  Follow-up in the office in 1 year with labs just prior  CC: Follow-up    History of Present Illness     HPI: Kimmie Rendon is a 27y o  year old female with history of PCOS, micro prolactinoma, reported history of hypothyroidism who presents for a follow-up appointment  Initial consultation was done via telemedicine on 4/1/2020  She presents today for a follow-up overall feeling well  She continues on metformin XR 5 to milligrams twice daily and Saxenda 3 mg daily    She does have history of micro prolactinoma treated with cabergoline that was tapered off  She presents today overall feeling well  No new health issues or symptoms of concern  Review of Systems   Constitutional: Negative for fatigue  HENT: Negative for trouble swallowing and voice change  Eyes: Negative for visual disturbance  Respiratory: Negative for shortness of breath  Cardiovascular: Negative for palpitations and leg swelling  Gastrointestinal: Negative for abdominal pain, nausea and vomiting  Endocrine: Negative for polydipsia and polyuria  Musculoskeletal: Negative for arthralgias and myalgias  Skin: Negative for rash  Neurological: Negative for dizziness, tremors and weakness  Hematological: Negative for adenopathy  Psychiatric/Behavioral: Negative for agitation and confusion  Historical Information   History reviewed  No pertinent past medical history  History reviewed  No pertinent surgical history  Social History   Social History     Substance and Sexual Activity   Alcohol Use Yes   • Alcohol/week: 2 0 standard drinks   • Types: 2 Standard drinks or equivalent per week     Social History     Substance and Sexual Activity   Drug Use Never     Social History     Tobacco Use   Smoking Status Never   Smokeless Tobacco Never     Family History:   Family History   Problem Relation Age of Onset   • Breast cancer Mother    • No Known Problems Father    • No Known Problems Brother        Meds/Allergies   Current Outpatient Medications   Medication Sig Dispense Refill   • Insulin Pen Needle (BD Pen Needle Elvira U/F) 32G X 4 MM MISC Use daily 100 each 0   • metFORMIN (GLUCOPHAGE) 500 mg tablet Take 1 tablet (500 mg total) by mouth 2 (two) times a day 60 tablet 0   • Saxenda injection INJECT 0 5ML (3MG TOTAL) UNDER THE SKIN DAILY  15 mL 0     No current facility-administered medications for this visit       No Known Allergies    Objective   Vitals: Blood pressure 132/76, pulse 80, height 5' 6" (1 676 m), weight 72 8 kg (160 lb 9 6 oz), SpO2 99 %  Invasive Devices     None                 Physical Exam  Vitals reviewed  Constitutional:       General: She is not in acute distress  Appearance: She is well-developed  She is not diaphoretic  HENT:      Head: Normocephalic and atraumatic  Eyes:      Conjunctiva/sclera: Conjunctivae normal       Pupils: Pupils are equal, round, and reactive to light  Neck:      Thyroid: No thyromegaly  Cardiovascular:      Rate and Rhythm: Normal rate and regular rhythm  Pulmonary:      Effort: Pulmonary effort is normal  No respiratory distress  Breath sounds: Normal breath sounds  Abdominal:      General: Bowel sounds are normal       Palpations: Abdomen is soft  Musculoskeletal:         General: Normal range of motion  Cervical back: Normal range of motion and neck supple  Skin:     General: Skin is warm and dry  Findings: No rash  Neurological:      Mental Status: She is alert and oriented to person, place, and time  Motor: No abnormal muscle tone  Psychiatric:         Behavior: Behavior normal          The history was obtained from the review of the chart and from the patient      Lab Results:      Recent Results (from the past 22463 hour(s))   Comprehensive metabolic panel    Collection Time: 01/10/23 12:48 PM   Result Value Ref Range    Glucose, Random 83 70 - 99 mg/dL    BUN 6 6 - 20 mg/dL    Creatinine 0 60 0 57 - 1 00 mg/dL    eGFR 121 >59 mL/min/1 73    SL AMB BUN/CREATININE RATIO 10 9 - 23    Sodium 140 134 - 144 mmol/L    Potassium 4 0 3 5 - 5 2 mmol/L    Chloride 100 96 - 106 mmol/L    CO2 24 20 - 29 mmol/L    CALCIUM 9 2 8 7 - 10 2 mg/dL    Protein, Total 6 8 6 0 - 8 5 g/dL    Albumin 4 5 3 8 - 4 8 g/dL    Globulin, Total 2 3 1 5 - 4 5 g/dL    Albumin/Globulin Ratio 2 0 1 2 - 2 2    TOTAL BILIRUBIN 0 6 0 0 - 1 2 mg/dL    Alk Phos Isoenzymes 86 44 - 121 IU/L    AST 21 0 - 40 IU/L    ALT 17 0 - 32 IU/L   Testosterone, free, total    Collection Time: 01/10/23 12:48 PM   Result Value Ref Range    TESTOSTERONE TOTAL 64 (H) 8 - 60 ng/dL    Testosterone, Free 3 3 0 0 - 4 2 pg/mL   DHEA-sulfate    Collection Time: 01/10/23 12:48 PM   Result Value Ref Range    DHEA-SO4 272 0 84 8 - 378 0 ug/dL   TSH, 3rd generation    Collection Time: 01/10/23 12:48 PM   Result Value Ref Range    TSH 2 130 0 450 - 4 500 uIU/mL   Prolactin    Collection Time: 01/10/23 12:48 PM   Result Value Ref Range    Prolactin 12 0 4 8 - 23 3 ng/mL   17-Hydroxyprogesterone    Collection Time: 01/10/23 12:48 PM   Result Value Ref Range    17-OH PROGESTERONE 53 ng/dL         No future appointments  Portions of the record may have been created with voice recognition software  Occasional wrong word or "sound a like" substitutions may have occurred due to the inherent limitations of voice recognition software  Read the chart carefully and recognize, using context, where substitutions have occurred

## 2023-02-02 ENCOUNTER — TELEPHONE (OUTPATIENT)
Dept: ENDOCRINOLOGY | Facility: CLINIC | Age: 33
End: 2023-02-02

## 2023-02-02 DIAGNOSIS — E66.3 OVERWEIGHT: Primary | ICD-10-CM

## 2023-02-02 DIAGNOSIS — E28.2 PCOS (POLYCYSTIC OVARIAN SYNDROME): ICD-10-CM

## 2023-02-02 RX ORDER — SEMAGLUTIDE 1 MG/.5ML
INJECTION, SOLUTION SUBCUTANEOUS
Qty: 2 ML | Refills: 0 | Status: SHIPPED | OUTPATIENT
Start: 2023-02-02 | End: 2023-02-06

## 2023-02-02 NOTE — TELEPHONE ENCOUNTER
Yanni Parkinson [facility])  Rx #: [id number]  Saxenda 18MG/3ML pen-injectors       Form  [employer] 4-Part Electronic PA Form (4238 NCPDP)  Created  1 day ago  Sent to Plan  9 minutes ago  Plan Response  9 minutes ago  Submit Clinical Questions  1 minute ago  Determination  Favorable  1 minute ago  Your prior authorization for [facility] [address] [place] [address] has been approved! MORE INFOFor eligible patients, copay assistance may be available  To learn more and be redirected to the [place] website, click on the "More Info" button to the right  Please also note that you may need to schedule a follow-up visit with your patient prior to the expiration of this prior authorization, as updated patient weight may be required for reauthorization  Message from plan: PA Case: [id number], Status: Approved, Coverage Starts on: 2/2/2023 12:00:00 AM, Coverage Ends on: 4/27/2023 12:00:00 AM     Letter faxed to Pt's pharmacy

## 2023-02-06 DIAGNOSIS — E66.3 OVERWEIGHT: Primary | ICD-10-CM

## 2023-02-06 DIAGNOSIS — E28.2 PCOS (POLYCYSTIC OVARIAN SYNDROME): ICD-10-CM

## 2023-02-06 DIAGNOSIS — R73.9 HYPERGLYCEMIA: ICD-10-CM

## 2023-02-07 ENCOUNTER — TELEPHONE (OUTPATIENT)
Dept: ENDOCRINOLOGY | Facility: CLINIC | Age: 33
End: 2023-02-07

## 2023-02-07 NOTE — TELEPHONE ENCOUNTER
Simon Rausch (Ruffin: [id number])  Ozempic (0 25 or 0 5 MG/DOSE) 2MG/1 5ML pen-injectors       Form  [employer] 4-Part Electronic PA Form (2017 NCPDP)  Created  6 minutes ago  Sent to Plan  4 minutes ago  Plan Response  4 minutes ago  Submit Clinical Questions  less than a minute ago  Determination  Wait for Determination  Please wait for [employer] [facility] to return a determination

## 2023-02-09 DIAGNOSIS — E28.2 PCOS (POLYCYSTIC OVARIAN SYNDROME): ICD-10-CM

## 2023-02-09 DIAGNOSIS — R73.9 HYPERGLYCEMIA: ICD-10-CM

## 2023-02-09 DIAGNOSIS — E66.3 OVERWEIGHT: ICD-10-CM

## 2023-02-09 RX ORDER — LIRAGLUTIDE 6 MG/ML
INJECTION, SOLUTION SUBCUTANEOUS
Qty: 15 ML | Refills: 0 | Status: SHIPPED | OUTPATIENT
Start: 2023-02-09

## 2023-03-05 DIAGNOSIS — E28.2 PCOS (POLYCYSTIC OVARIAN SYNDROME): ICD-10-CM

## 2023-03-05 DIAGNOSIS — E66.3 OVERWEIGHT: ICD-10-CM

## 2023-03-05 DIAGNOSIS — R73.9 HYPERGLYCEMIA: ICD-10-CM

## 2023-03-06 RX ORDER — LIRAGLUTIDE 6 MG/ML
INJECTION, SOLUTION SUBCUTANEOUS
Qty: 15 ML | Refills: 0 | Status: SHIPPED | OUTPATIENT
Start: 2023-03-06

## 2023-03-06 NOTE — TELEPHONE ENCOUNTER
Requested medication(s) are due for refill today: Yes  Patient has already received a courtesy refill: Yes  Other reason request has been forwarded to provider: HBA1C within 180 days

## 2023-03-30 DIAGNOSIS — E28.2 PCOS (POLYCYSTIC OVARIAN SYNDROME): ICD-10-CM

## 2023-03-30 DIAGNOSIS — E66.3 OVERWEIGHT: ICD-10-CM

## 2023-03-30 DIAGNOSIS — R73.9 HYPERGLYCEMIA: ICD-10-CM

## 2023-03-31 RX ORDER — LIRAGLUTIDE 6 MG/ML
INJECTION, SOLUTION SUBCUTANEOUS
Qty: 15 ML | Refills: 0 | Status: SHIPPED | OUTPATIENT
Start: 2023-03-31

## 2023-04-24 DIAGNOSIS — E28.2 PCOS (POLYCYSTIC OVARIAN SYNDROME): ICD-10-CM

## 2023-04-24 NOTE — TELEPHONE ENCOUNTER
Requested medication(s) are due for refill today: Yes  Patient has already received a courtesy refill: No  Other reason request has been forwarded to provider: chris moore

## 2023-04-28 DIAGNOSIS — R73.9 HYPERGLYCEMIA: ICD-10-CM

## 2023-04-28 DIAGNOSIS — E28.2 PCOS (POLYCYSTIC OVARIAN SYNDROME): ICD-10-CM

## 2023-04-28 DIAGNOSIS — E66.3 OVERWEIGHT: ICD-10-CM

## 2023-04-28 RX ORDER — LIRAGLUTIDE 6 MG/ML
3 INJECTION, SOLUTION SUBCUTANEOUS DAILY
Qty: 15 ML | Refills: 2 | Status: SHIPPED | OUTPATIENT
Start: 2023-04-28 | End: 2023-07-27

## 2023-04-28 NOTE — TELEPHONE ENCOUNTER
Requested medication(s) are due for refill today: Yes  Patient has already received a courtesy refill: Yes  Other reason request has been forwarded to provider:   Endocrinology:  Diabetes - GLP-1 Receptor Agonists Failed 04/28/2023 02:58 PM   Protocol Details  HBA1C within 180 days

## 2023-05-02 DIAGNOSIS — E28.2 PCOS (POLYCYSTIC OVARIAN SYNDROME): ICD-10-CM

## 2023-05-02 DIAGNOSIS — E66.3 OVERWEIGHT: ICD-10-CM

## 2023-05-02 DIAGNOSIS — R73.9 HYPERGLYCEMIA: ICD-10-CM

## 2023-05-02 RX ORDER — LIRAGLUTIDE 6 MG/ML
INJECTION, SOLUTION SUBCUTANEOUS
Qty: 15 ML | Refills: 0 | Status: SHIPPED | OUTPATIENT
Start: 2023-05-02

## 2023-05-02 NOTE — TELEPHONE ENCOUNTER
Requested medication(s) are due for refill today:   Patient has already received a courtesy refill:   Other reason request has been forwarded to provider: Can you look at this? It looks like refills are being sent monthly, with the most recent order placed last week?

## 2023-05-03 ENCOUNTER — TELEPHONE (OUTPATIENT)
Dept: ENDOCRINOLOGY | Facility: CLINIC | Age: 33
End: 2023-05-03

## 2023-05-03 NOTE — TELEPHONE ENCOUNTER
Conley Simmonds [facility])  [facility] 18MG/3ML pen-injectors       Form  [employer] Anti-Obesity Prior Authorization Request Form     Plan Contact  [phone] phone  [phone] fax

## 2023-05-10 NOTE — TELEPHONE ENCOUNTER
Patient called the [facility] office  She spoke to her inusrance this morning and was told they are waiting on documentation from the office  They could not tell her what documentation was needed though  They gave her the fax number [phone]

## 2023-05-12 NOTE — TELEPHONE ENCOUNTER
"Patient sent message through her my chart- I have resubmitted the determination under reference number [id number] and listed the request as Adele Lambert is disputing the decision to dismiss the prior request \" Our clinical department will fax paperwork to your physician  Once the clinical department receives the completed paperwork from your physician, a determination will be made within 72 hours  Once the decision has been made, the determination will be faxed to your physician’s office, and you will receive a written copy of the determination in the mail within 7-10 business days  If the determination is denied, you have the right to appeal the decision, and appeal information will be contained within the denial   Should you require further assistance, please reply to this email  If you would prefer to contact us via phone, you may call our Henri Willard at [phone]  24 hours a day, 7 days a week, including holidays       Bre Bernal  "

## 2023-05-16 DIAGNOSIS — E28.2 PCOS (POLYCYSTIC OVARIAN SYNDROME): ICD-10-CM

## 2023-06-08 DIAGNOSIS — E28.2 PCOS (POLYCYSTIC OVARIAN SYNDROME): ICD-10-CM

## 2023-06-09 DIAGNOSIS — E28.2 PCOS (POLYCYSTIC OVARIAN SYNDROME): ICD-10-CM

## 2023-06-09 DIAGNOSIS — E66.3 OVERWEIGHT: ICD-10-CM

## 2023-06-09 DIAGNOSIS — R73.9 HYPERGLYCEMIA: ICD-10-CM

## 2023-06-09 RX ORDER — LIRAGLUTIDE 6 MG/ML
INJECTION, SOLUTION SUBCUTANEOUS
Qty: 15 ML | Refills: 0 | Status: SHIPPED | OUTPATIENT
Start: 2023-06-09

## 2023-06-09 NOTE — TELEPHONE ENCOUNTER
Requested medication(s) are due for refill today: Yes  Patient has already received a courtesy refill: Yes  Other reason request has been forwarded to provider:   Endocrinology:  Diabetes - GLP-1 Receptor Agonists Failed 06/09/2023 07:12 AM   Protocol Details  HBA1C within 180 days

## 2023-08-26 DIAGNOSIS — E28.2 PCOS (POLYCYSTIC OVARIAN SYNDROME): ICD-10-CM

## 2023-09-20 DIAGNOSIS — E28.2 PCOS (POLYCYSTIC OVARIAN SYNDROME): ICD-10-CM

## 2023-09-20 DIAGNOSIS — E28.2 PCOS (POLYCYSTIC OVARIAN SYNDROME): Primary | ICD-10-CM

## 2023-09-20 DIAGNOSIS — R73.9 HYPERGLYCEMIA: ICD-10-CM

## 2023-09-21 LAB
ALBUMIN SERPL-MCNC: 4.5 G/DL (ref 3.9–4.9)
ALBUMIN/GLOB SERPL: 2 {RATIO} (ref 1.2–2.2)
ALP SERPL-CCNC: 85 IU/L (ref 44–121)
ALT SERPL-CCNC: 26 IU/L (ref 0–32)
AST SERPL-CCNC: 26 IU/L (ref 0–40)
BILIRUB SERPL-MCNC: 0.5 MG/DL (ref 0–1.2)
BUN SERPL-MCNC: 7 MG/DL (ref 6–20)
BUN/CREAT SERPL: 10 (ref 9–23)
CALCIUM SERPL-MCNC: 8.9 MG/DL (ref 8.7–10.2)
CHLORIDE SERPL-SCNC: 104 MMOL/L (ref 96–106)
CO2 SERPL-SCNC: 21 MMOL/L (ref 20–29)
CREAT SERPL-MCNC: 0.71 MG/DL (ref 0.57–1)
DHEA-S SERPL-MCNC: 316 UG/DL (ref 57.3–279.2)
EGFR: 114 ML/MIN/1.73
EST. AVERAGE GLUCOSE BLD GHB EST-MCNC: 80 MG/DL
GLOBULIN SER-MCNC: 2.2 G/DL (ref 1.5–4.5)
GLUCOSE SERPL-MCNC: 81 MG/DL (ref 70–99)
HBA1C MFR BLD: 4.4 % (ref 4.8–5.6)
POTASSIUM SERPL-SCNC: 4.3 MMOL/L (ref 3.5–5.2)
PROT SERPL-MCNC: 6.7 G/DL (ref 6–8.5)
SODIUM SERPL-SCNC: 140 MMOL/L (ref 134–144)
T4 FREE SERPL-MCNC: 1.23 NG/DL (ref 0.82–1.77)
TESTOST FREE SERPL-MCNC: 2.3 PG/ML (ref 0–4.2)
TESTOST SERPL-MCNC: 74 NG/DL (ref 8–60)
TSH SERPL DL<=0.005 MIU/L-ACNC: 2.19 UIU/ML (ref 0.45–4.5)

## 2023-10-11 DIAGNOSIS — E28.2 PCOS (POLYCYSTIC OVARIAN SYNDROME): ICD-10-CM

## 2023-11-03 DIAGNOSIS — E28.2 PCOS (POLYCYSTIC OVARIAN SYNDROME): ICD-10-CM

## 2024-01-17 DIAGNOSIS — D35.2 MICROPROLACTINOMA (HCC): ICD-10-CM

## 2024-01-17 DIAGNOSIS — E66.3 OVERWEIGHT: ICD-10-CM

## 2024-01-17 DIAGNOSIS — E28.2 PCOS (POLYCYSTIC OVARIAN SYNDROME): Primary | ICD-10-CM

## 2024-01-17 DIAGNOSIS — R73.9 HYPERGLYCEMIA: ICD-10-CM

## 2024-02-01 LAB
17OHP SERPL-MCNC: 58 NG/DL
ALBUMIN SERPL-MCNC: 4.7 G/DL (ref 3.9–4.9)
ALBUMIN/GLOB SERPL: 2.5 {RATIO} (ref 1.2–2.2)
ALP SERPL-CCNC: 82 IU/L (ref 44–121)
ALT SERPL-CCNC: 23 IU/L (ref 0–32)
AST SERPL-CCNC: 23 IU/L (ref 0–40)
BILIRUB SERPL-MCNC: 0.8 MG/DL (ref 0–1.2)
BUN SERPL-MCNC: 10 MG/DL (ref 6–20)
BUN/CREAT SERPL: 14 (ref 9–23)
CALCIUM SERPL-MCNC: 9.1 MG/DL (ref 8.7–10.2)
CHLORIDE SERPL-SCNC: 104 MMOL/L (ref 96–106)
CO2 SERPL-SCNC: 21 MMOL/L (ref 20–29)
CREAT SERPL-MCNC: 0.71 MG/DL (ref 0.57–1)
DHEA-S SERPL-MCNC: 367 UG/DL (ref 57.3–279.2)
EGFR: 114 ML/MIN/1.73
GLOBULIN SER-MCNC: 1.9 G/DL (ref 1.5–4.5)
GLUCOSE SERPL-MCNC: 86 MG/DL (ref 70–99)
POTASSIUM SERPL-SCNC: 4.2 MMOL/L (ref 3.5–5.2)
PROLACTIN SERPL-MCNC: 12.4 NG/ML (ref 4.8–33.4)
PROT SERPL-MCNC: 6.6 G/DL (ref 6–8.5)
SODIUM SERPL-SCNC: 138 MMOL/L (ref 134–144)
T4 FREE SERPL-MCNC: 1.14 NG/DL (ref 0.82–1.77)
TESTOST FREE SERPL-MCNC: 1 PG/ML (ref 0–4.2)
TESTOST SERPL-MCNC: 49 NG/DL (ref 8–60)
TSH SERPL DL<=0.005 MIU/L-ACNC: 2.62 UIU/ML (ref 0.45–4.5)

## 2024-02-05 ENCOUNTER — OFFICE VISIT (OUTPATIENT)
Dept: ENDOCRINOLOGY | Facility: CLINIC | Age: 34
End: 2024-02-05
Payer: COMMERCIAL

## 2024-02-05 VITALS
HEART RATE: 84 BPM | DIASTOLIC BLOOD PRESSURE: 90 MMHG | WEIGHT: 157.2 LBS | SYSTOLIC BLOOD PRESSURE: 124 MMHG | BODY MASS INDEX: 25.26 KG/M2 | HEIGHT: 66 IN

## 2024-02-05 DIAGNOSIS — R73.9 HYPERGLYCEMIA: ICD-10-CM

## 2024-02-05 DIAGNOSIS — E28.2 PCOS (POLYCYSTIC OVARIAN SYNDROME): Primary | ICD-10-CM

## 2024-02-05 DIAGNOSIS — D35.2 MICROPROLACTINOMA (HCC): ICD-10-CM

## 2024-02-05 PROCEDURE — 99214 OFFICE O/P EST MOD 30 MIN: CPT | Performed by: INTERNAL MEDICINE

## 2024-02-05 NOTE — PROGRESS NOTES
2/5/2024    Assessment/Plan      Diagnoses and all orders for this visit:    PCOS (polycystic ovarian syndrome)  -     Prolactin; Future  -     Testosterone, free, total; Future  -     DHEA-sulfate; Future  -     Comprehensive metabolic panel; Future  -     TSH, 3rd generation; Future  -     T4, free; Future  -     Hemoglobin A1C; Future    Hyperglycemia  -     Prolactin; Future  -     Testosterone, free, total; Future  -     DHEA-sulfate; Future  -     Comprehensive metabolic panel; Future  -     TSH, 3rd generation; Future  -     T4, free; Future  -     Hemoglobin A1C; Future    Microprolactinoma (HCC)  -     Prolactin; Future  -     Testosterone, free, total; Future  -     DHEA-sulfate; Future  -     Comprehensive metabolic panel; Future  -     TSH, 3rd generation; Future  -     T4, free; Future  -     Hemoglobin A1C; Future        Assessment/Plan:  32-year-old female presents for a follow-up appointment for history of PCOS and difficulty with weight loss.  We elected to continue current regimen at this time.  We did talk about seeing how things progress off of Saxenda and she will consider this over the next few weeks or month and let me know if she chooses to discontinue Saxenda temporarily we will keep me posted.  We arrange for follow-up and monitor lab work over time.      CC: Follow-up    History of Present Illness     HPI: Lavon Guzmán is a 33 y.o. year old female with history of PCOS, microprolactinoma as well as reported history of hypothyroidism who presents for a follow-up appointment.  Initial consultation was done via telemedicine in April 2020.  She continues on metformin  mg twice daily and Saxenda 3 mg daily.  Dopamine agonist therapy for microprolactinoma was tapered off years ago.  She presents today overall feeling well.  Has adjusted her diet for increase in protein and less carbs and reports 5 pounds weight loss over the past week so far.    Review of Systems   Constitutional:   "Negative for fatigue.   HENT:  Negative for trouble swallowing and voice change.    Eyes:  Negative for visual disturbance.   Respiratory:  Negative for shortness of breath.    Cardiovascular:  Negative for palpitations and leg swelling.   Gastrointestinal:  Negative for abdominal pain, nausea and vomiting.   Endocrine: Negative for polydipsia and polyuria.   Musculoskeletal:  Negative for arthralgias and myalgias.   Skin:  Negative for rash.   Neurological:  Negative for dizziness, tremors and weakness.   Hematological:  Negative for adenopathy.   Psychiatric/Behavioral:  Negative for agitation and confusion.        Historical Information   History reviewed. No pertinent past medical history.  History reviewed. No pertinent surgical history.  Social History   Social History     Substance and Sexual Activity   Alcohol Use Yes    Alcohol/week: 2.0 standard drinks of alcohol    Types: 2 Standard drinks or equivalent per week     Social History     Substance and Sexual Activity   Drug Use Never     Social History     Tobacco Use   Smoking Status Never   Smokeless Tobacco Never     Family History:   Family History   Problem Relation Age of Onset    Breast cancer Mother     No Known Problems Father     No Known Problems Brother        Meds/Allergies   Current Outpatient Medications   Medication Sig Dispense Refill    Insulin Pen Needle (BD Pen Needle Elvira U/F) 32G X 4 MM MISC Use daily 100 each 0    liraglutide (Saxenda) injection Inject 3mg subcutaneously daily as directed 15 mL 5    metFORMIN (GLUCOPHAGE) 500 mg tablet Take 1 tablet (500 mg total) by mouth 2 (two) times a day 60 tablet 1    metFORMIN (GLUCOPHAGE) 500 mg tablet Take 1 tablet (500 mg total) by mouth 2 (two) times a day (Patient not taking: Reported on 2/5/2024) 60 tablet 0     No current facility-administered medications for this visit.     No Known Allergies    Objective   Vitals: Blood pressure 124/90, pulse 84, height 5' 6\" (1.676 m), weight 71.3 kg " (157 lb 3.2 oz).  Invasive Devices       None                   Physical Exam  Vitals reviewed.   Constitutional:       General: She is not in acute distress.     Appearance: She is well-developed. She is not diaphoretic.   HENT:      Head: Normocephalic and atraumatic.   Eyes:      Conjunctiva/sclera: Conjunctivae normal.      Pupils: Pupils are equal, round, and reactive to light.   Neck:      Thyroid: No thyromegaly.   Cardiovascular:      Rate and Rhythm: Normal rate and regular rhythm.   Pulmonary:      Effort: Pulmonary effort is normal. No respiratory distress.      Breath sounds: Normal breath sounds.   Abdominal:      General: Bowel sounds are normal.      Palpations: Abdomen is soft.   Musculoskeletal:         General: Normal range of motion.      Cervical back: Normal range of motion and neck supple.   Skin:     General: Skin is warm and dry.      Findings: No rash.   Neurological:      Mental Status: She is alert and oriented to person, place, and time.      Motor: No abnormal muscle tone.   Psychiatric:         Behavior: Behavior normal.         The history was obtained from the review of the chart and from the patient.    Lab Results:      Recent Results (from the past 45655 hour(s))   Comprehensive metabolic panel    Collection Time: 01/10/23 12:48 PM   Result Value Ref Range    Glucose, Random 83 70 - 99 mg/dL    BUN 6 6 - 20 mg/dL    Creatinine 0.60 0.57 - 1.00 mg/dL    eGFR 121 >59 mL/min/1.73    SL AMB BUN/CREATININE RATIO 10 9 - 23    Sodium 140 134 - 144 mmol/L    Potassium 4.0 3.5 - 5.2 mmol/L    Chloride 100 96 - 106 mmol/L    CO2 24 20 - 29 mmol/L    CALCIUM 9.2 8.7 - 10.2 mg/dL    Protein, Total 6.8 6.0 - 8.5 g/dL    Albumin 4.5 3.8 - 4.8 g/dL    Globulin, Total 2.3 1.5 - 4.5 g/dL    Albumin/Globulin Ratio 2.0 1.2 - 2.2    TOTAL BILIRUBIN 0.6 0.0 - 1.2 mg/dL    Alk Phos Isoenzymes 86 44 - 121 IU/L    AST 21 0 - 40 IU/L    ALT 17 0 - 32 IU/L   Testosterone, free, total    Collection Time:  01/10/23 12:48 PM   Result Value Ref Range    TESTOSTERONE TOTAL 64 (H) 8 - 60 ng/dL    Testosterone, Free 3.3 0.0 - 4.2 pg/mL   DHEA-sulfate    Collection Time: 01/10/23 12:48 PM   Result Value Ref Range    DHEA-SO4 272.0 84.8 - 378.0 ug/dL   TSH, 3rd generation    Collection Time: 01/10/23 12:48 PM   Result Value Ref Range    TSH 2.130 0.450 - 4.500 uIU/mL   Prolactin    Collection Time: 01/10/23 12:48 PM   Result Value Ref Range    Prolactin 12.0 4.8 - 23.3 ng/mL   17-Hydroxyprogesterone    Collection Time: 01/10/23 12:48 PM   Result Value Ref Range    17-OH PROGESTERONE 53 ng/dL   Hemoglobin A1C    Collection Time: 09/20/23 11:02 AM   Result Value Ref Range    Hemoglobin A1C 4.4 (L) 4.8 - 5.6 %    Estimated Average Glucose 80 mg/dL   Comprehensive metabolic panel    Collection Time: 09/20/23 11:02 AM   Result Value Ref Range    Glucose, Random 81 70 - 99 mg/dL    BUN 7 6 - 20 mg/dL    Creatinine 0.71 0.57 - 1.00 mg/dL    eGFR 114 >59 mL/min/1.73    SL AMB BUN/CREATININE RATIO 10 9 - 23    Sodium 140 134 - 144 mmol/L    Potassium 4.3 3.5 - 5.2 mmol/L    Chloride 104 96 - 106 mmol/L    CO2 21 20 - 29 mmol/L    CALCIUM 8.9 8.7 - 10.2 mg/dL    Protein, Total 6.7 6.0 - 8.5 g/dL    Albumin 4.5 3.9 - 4.9 g/dL    Globulin, Total 2.2 1.5 - 4.5 g/dL    Albumin/Globulin Ratio 2.0 1.2 - 2.2    TOTAL BILIRUBIN 0.5 0.0 - 1.2 mg/dL    Alk Phos Isoenzymes 85 44 - 121 IU/L    AST 26 0 - 40 IU/L    ALT 26 0 - 32 IU/L   TSH, 3rd generation    Collection Time: 09/20/23 11:02 AM   Result Value Ref Range    TSH 2.190 0.450 - 4.500 uIU/mL   T4, free    Collection Time: 09/20/23 11:02 AM   Result Value Ref Range    Free t4 1.23 0.82 - 1.77 ng/dL   Testosterone, free, total Lab Collect    Collection Time: 09/20/23 11:02 AM   Result Value Ref Range    TESTOSTERONE TOTAL 74 (H) 8 - 60 ng/dL    Testosterone, Free 2.3 0.0 - 4.2 pg/mL   DHEA-sulfate- Lab Collect    Collection Time: 09/20/23 11:02 AM   Result Value Ref Range    DHEA-SO4 316.0  "(H) 57.3 - 279.2 ug/dL   17-Hydroxyprogesterone    Collection Time: 01/29/24 10:44 AM   Result Value Ref Range    17-OH PROGESTERONE 58 ng/dL   TSH, 3rd generation    Collection Time: 01/29/24 10:44 AM   Result Value Ref Range    TSH 2.620 0.450 - 4.500 uIU/mL   T4, free    Collection Time: 01/29/24 10:44 AM   Result Value Ref Range    Free t4 1.14 0.82 - 1.77 ng/dL   DHEA-sulfate    Collection Time: 01/29/24 10:44 AM   Result Value Ref Range    DHEA-SO4 367.0 (H) 57.3 - 279.2 ug/dL   Testosterone, free, total    Collection Time: 01/29/24 10:44 AM   Result Value Ref Range    TESTOSTERONE TOTAL 49 8 - 60 ng/dL    Testosterone, Free 1.0 0.0 - 4.2 pg/mL   Comprehensive metabolic panel    Collection Time: 01/29/24 10:44 AM   Result Value Ref Range    Glucose, Random 86 70 - 99 mg/dL    BUN 10 6 - 20 mg/dL    Creatinine 0.71 0.57 - 1.00 mg/dL    eGFR 114 >59 mL/min/1.73    SL AMB BUN/CREATININE RATIO 14 9 - 23    Sodium 138 134 - 144 mmol/L    Potassium 4.2 3.5 - 5.2 mmol/L    Chloride 104 96 - 106 mmol/L    CO2 21 20 - 29 mmol/L    CALCIUM 9.1 8.7 - 10.2 mg/dL    Protein, Total 6.6 6.0 - 8.5 g/dL    Albumin 4.7 3.9 - 4.9 g/dL    Globulin, Total 1.9 1.5 - 4.5 g/dL    Albumin/Globulin Ratio 2.5 (H) 1.2 - 2.2    TOTAL BILIRUBIN 0.8 0.0 - 1.2 mg/dL    Alk Phos Isoenzymes 82 44 - 121 IU/L    AST 23 0 - 40 IU/L    ALT 23 0 - 32 IU/L   Prolactin    Collection Time: 01/29/24 10:44 AM   Result Value Ref Range    Prolactin 12.4 4.8 - 33.4 ng/mL         No future appointments.      Portions of the record may have been created with voice recognition software. Occasional wrong word or \"sound a like\" substitutions may have occurred due to the inherent limitations of voice recognition software. Read the chart carefully and recognize, using context, where substitutions have occurred.    "

## 2024-02-20 DIAGNOSIS — E28.2 PCOS (POLYCYSTIC OVARIAN SYNDROME): ICD-10-CM

## 2024-02-20 DIAGNOSIS — R73.9 HYPERGLYCEMIA: ICD-10-CM

## 2024-02-20 DIAGNOSIS — E66.3 OVERWEIGHT: ICD-10-CM

## 2024-02-21 RX ORDER — LIRAGLUTIDE 6 MG/ML
INJECTION, SOLUTION SUBCUTANEOUS
Qty: 15 ML | Refills: 0 | Status: SHIPPED | OUTPATIENT
Start: 2024-02-21

## 2024-07-18 DIAGNOSIS — Z00.6 ENCOUNTER FOR EXAMINATION FOR NORMAL COMPARISON OR CONTROL IN CLINICAL RESEARCH PROGRAM: ICD-10-CM

## 2024-08-16 DIAGNOSIS — E28.2 PCOS (POLYCYSTIC OVARIAN SYNDROME): ICD-10-CM

## 2024-09-10 DIAGNOSIS — E28.2 PCOS (POLYCYSTIC OVARIAN SYNDROME): ICD-10-CM

## 2024-11-20 DIAGNOSIS — E28.2 PCOS (POLYCYSTIC OVARIAN SYNDROME): ICD-10-CM

## 2024-11-20 NOTE — TELEPHONE ENCOUNTER
Reason for call:   [x] Refill   [] Prior Auth  [x] Other: Pt requested to change script for a 30 day supply instead of 90.    Office:   [] PCP/Provider -   [x] Specialty/Provider - Bill Alonzo DO     Medication: metFORMIN 500 mg    Dose/Frequency: 1 tab BID / 60 tabs      Pharmacy: [facility] # [id number]      Does the patient have enough for 3 days?   [x] Yes   [] No - Send as HP to POD

## 2025-06-06 DIAGNOSIS — E28.2 PCOS (POLYCYSTIC OVARIAN SYNDROME): ICD-10-CM

## 2025-06-30 DIAGNOSIS — E28.2 PCOS (POLYCYSTIC OVARIAN SYNDROME): ICD-10-CM

## 2025-08-01 DIAGNOSIS — E28.2 PCOS (POLYCYSTIC OVARIAN SYNDROME): ICD-10-CM
